# Patient Record
Sex: MALE | Race: BLACK OR AFRICAN AMERICAN | URBAN - METROPOLITAN AREA
[De-identification: names, ages, dates, MRNs, and addresses within clinical notes are randomized per-mention and may not be internally consistent; named-entity substitution may affect disease eponyms.]

---

## 2022-02-21 ENCOUNTER — WEB ENCOUNTER (OUTPATIENT)
Dept: URBAN - METROPOLITAN AREA CLINIC 98 | Facility: CLINIC | Age: 57
End: 2022-02-21

## 2022-02-21 ENCOUNTER — LAB OUTSIDE AN ENCOUNTER (OUTPATIENT)
Dept: URBAN - METROPOLITAN AREA CLINIC 98 | Facility: CLINIC | Age: 57
End: 2022-02-21

## 2022-02-21 ENCOUNTER — OFFICE VISIT (OUTPATIENT)
Dept: URBAN - METROPOLITAN AREA CLINIC 98 | Facility: CLINIC | Age: 57
End: 2022-02-21
Payer: COMMERCIAL

## 2022-02-21 VITALS
TEMPERATURE: 96.8 F | WEIGHT: 185.8 LBS | HEART RATE: 86 BPM | SYSTOLIC BLOOD PRESSURE: 111 MMHG | HEIGHT: 72 IN | DIASTOLIC BLOOD PRESSURE: 74 MMHG | BODY MASS INDEX: 25.17 KG/M2

## 2022-02-21 DIAGNOSIS — Z12.11 COLON CANCER SCREENING: ICD-10-CM

## 2022-02-21 DIAGNOSIS — K21.9 GERD: ICD-10-CM

## 2022-02-21 DIAGNOSIS — R10.13 DYSPEPSIA: ICD-10-CM

## 2022-02-21 PROCEDURE — 3017F COLORECTAL CA SCREEN DOC REV: CPT | Performed by: INTERNAL MEDICINE

## 2022-02-21 PROCEDURE — G8482 FLU IMMUNIZE ORDER/ADMIN: HCPCS | Performed by: INTERNAL MEDICINE

## 2022-02-21 PROCEDURE — G8420 CALC BMI NORM PARAMETERS: HCPCS | Performed by: INTERNAL MEDICINE

## 2022-02-21 PROCEDURE — G9903 PT SCRN TBCO ID AS NON USER: HCPCS | Performed by: INTERNAL MEDICINE

## 2022-02-21 PROCEDURE — G8427 DOCREV CUR MEDS BY ELIG CLIN: HCPCS | Performed by: INTERNAL MEDICINE

## 2022-02-21 PROCEDURE — 99204 OFFICE O/P NEW MOD 45 MIN: CPT | Performed by: INTERNAL MEDICINE

## 2022-02-21 RX ORDER — OMEPRAZOLE 40 MG/1
1 CAPSULE 30 MINUTES BEFORE MORNING MEAL CAPSULE, DELAYED RELEASE ORAL ONCE A DAY
Qty: 90 | Refills: 3 | OUTPATIENT
Start: 2022-02-21

## 2022-02-21 NOTE — HPI-TODAY'S VISIT:
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  He saw general surgery without concerns Symptoms worse with inciting foods Taking pepcid OTC, which does help.  Taking PRN No weight loss or dysphagia

## 2022-03-04 ENCOUNTER — LAB OUTSIDE AN ENCOUNTER (OUTPATIENT)
Dept: URBAN - METROPOLITAN AREA CLINIC 98 | Facility: CLINIC | Age: 57
End: 2022-03-04

## 2022-03-04 ENCOUNTER — TELEPHONE ENCOUNTER (OUTPATIENT)
Dept: URBAN - METROPOLITAN AREA CLINIC 40 | Facility: CLINIC | Age: 57
End: 2022-03-04

## 2022-03-08 LAB
H PYLORI BREATH TEST: POSITIVE
H. PYLORI BREATH COLLECTION: (no result)

## 2022-03-10 ENCOUNTER — LAB OUTSIDE AN ENCOUNTER (OUTPATIENT)
Dept: URBAN - METROPOLITAN AREA CLINIC 98 | Facility: CLINIC | Age: 57
End: 2022-03-10

## 2022-03-10 ENCOUNTER — TELEPHONE ENCOUNTER (OUTPATIENT)
Dept: URBAN - METROPOLITAN AREA CLINIC 98 | Facility: CLINIC | Age: 57
End: 2022-03-10

## 2022-03-10 RX ORDER — AMOXICILLIN 500 MG/1
2 CAPSULES TABLET, FILM COATED ORAL TWICE A DAY
Qty: 56 CAPSULE | Refills: 0 | OUTPATIENT

## 2022-03-10 RX ORDER — OMEPRAZOLE 20 MG/1
1 CAPSULE 30 MINUTES BEFORE MORNING MEAL CAPSULE, DELAYED RELEASE ORAL
Qty: 28 | Refills: 0 | OUTPATIENT

## 2022-03-10 RX ORDER — CLARITHROMYCIN 500 MG/1
1 TABLET TABLET, FILM COATED ORAL
Qty: 28 TABLET | Refills: 0 | OUTPATIENT

## 2022-05-09 ENCOUNTER — LAB OUTSIDE AN ENCOUNTER (OUTPATIENT)
Dept: URBAN - METROPOLITAN AREA CLINIC 98 | Facility: CLINIC | Age: 57
End: 2022-05-09

## 2022-05-09 ENCOUNTER — WEB ENCOUNTER (OUTPATIENT)
Dept: URBAN - METROPOLITAN AREA CLINIC 98 | Facility: CLINIC | Age: 57
End: 2022-05-09

## 2022-05-09 ENCOUNTER — OFFICE VISIT (OUTPATIENT)
Dept: URBAN - METROPOLITAN AREA CLINIC 98 | Facility: CLINIC | Age: 57
End: 2022-05-09
Payer: COMMERCIAL

## 2022-05-09 VITALS
DIASTOLIC BLOOD PRESSURE: 80 MMHG | BODY MASS INDEX: 25.38 KG/M2 | WEIGHT: 187.4 LBS | HEART RATE: 79 BPM | SYSTOLIC BLOOD PRESSURE: 135 MMHG | HEIGHT: 72 IN | TEMPERATURE: 97 F

## 2022-05-09 DIAGNOSIS — Z12.11 COLON CANCER SCREENING: ICD-10-CM

## 2022-05-09 DIAGNOSIS — K21.9 GERD: ICD-10-CM

## 2022-05-09 DIAGNOSIS — A04.8 HELICOBACTER PYLORI (H. PYLORI): ICD-10-CM

## 2022-05-09 DIAGNOSIS — R10.13 DYSPEPSIA: ICD-10-CM

## 2022-05-09 PROCEDURE — G8427 DOCREV CUR MEDS BY ELIG CLIN: HCPCS | Performed by: INTERNAL MEDICINE

## 2022-05-09 PROCEDURE — 3017F COLORECTAL CA SCREEN DOC REV: CPT | Performed by: INTERNAL MEDICINE

## 2022-05-09 PROCEDURE — G8482 FLU IMMUNIZE ORDER/ADMIN: HCPCS | Performed by: INTERNAL MEDICINE

## 2022-05-09 PROCEDURE — 99214 OFFICE O/P EST MOD 30 MIN: CPT | Performed by: INTERNAL MEDICINE

## 2022-05-09 PROCEDURE — G9903 PT SCRN TBCO ID AS NON USER: HCPCS | Performed by: INTERNAL MEDICINE

## 2022-05-09 PROCEDURE — G8420 CALC BMI NORM PARAMETERS: HCPCS | Performed by: INTERNAL MEDICINE

## 2022-05-09 RX ORDER — AMOXICILLIN 500 MG/1
2 CAPSULES TABLET, FILM COATED ORAL TWICE A DAY
Qty: 56 CAPSULE | Refills: 0 | Status: ACTIVE | COMMUNITY

## 2022-05-09 RX ORDER — OMEPRAZOLE 20 MG/1
1 CAPSULE 30 MINUTES BEFORE MORNING MEAL CAPSULE, DELAYED RELEASE ORAL
Qty: 28 | Refills: 0 | Status: ACTIVE | COMMUNITY

## 2022-05-09 RX ORDER — CLARITHROMYCIN 500 MG/1
1 TABLET TABLET, FILM COATED ORAL
Qty: 28 TABLET | Refills: 0 | Status: ACTIVE | COMMUNITY

## 2022-05-09 RX ORDER — OMEPRAZOLE 40 MG/1
1 CAPSULE 30 MINUTES BEFORE MORNING MEAL CAPSULE, DELAYED RELEASE ORAL ONCE A DAY
Qty: 90 | Refills: 3 | Status: ACTIVE | COMMUNITY
Start: 2022-02-21

## 2022-05-09 NOTE — HPI-TODAY'S VISIT:
f/u visit After last visit we go HP breath test that was positive.   Treated with triple therapy that her completed around 3/24/22 He has continued on Omeprazole from time to time Abd pain seems to be improved, but not gone.  Stills has some discomfort Needs breath test to confirm eradication    . 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  He saw general surgery without concerns Symptoms worse with inciting foods Taking pepcid OTC, which does help.  Taking PRN No weight loss or dysphagia

## 2022-06-29 PROBLEM — 162031009: Status: ACTIVE | Noted: 2022-02-21

## 2022-07-20 ENCOUNTER — TELEPHONE ENCOUNTER (OUTPATIENT)
Dept: URBAN - METROPOLITAN AREA CLINIC 98 | Facility: CLINIC | Age: 57
End: 2022-07-20

## 2022-07-21 ENCOUNTER — OFFICE VISIT (OUTPATIENT)
Dept: URBAN - METROPOLITAN AREA SURGERY CENTER 18 | Facility: SURGERY CENTER | Age: 57
End: 2022-07-21

## 2022-09-19 ENCOUNTER — TELEPHONE ENCOUNTER (OUTPATIENT)
Dept: URBAN - METROPOLITAN AREA CLINIC 98 | Facility: CLINIC | Age: 57
End: 2022-09-19

## 2022-09-20 ENCOUNTER — TELEPHONE ENCOUNTER (OUTPATIENT)
Dept: URBAN - METROPOLITAN AREA CLINIC 98 | Facility: CLINIC | Age: 57
End: 2022-09-20

## 2022-09-22 ENCOUNTER — OFFICE VISIT (OUTPATIENT)
Dept: URBAN - METROPOLITAN AREA SURGERY CENTER 18 | Facility: SURGERY CENTER | Age: 57
End: 2022-09-22

## 2022-10-13 ENCOUNTER — CLAIMS CREATED FROM THE CLAIM WINDOW (OUTPATIENT)
Dept: URBAN - METROPOLITAN AREA SURGERY CENTER 18 | Facility: SURGERY CENTER | Age: 57
End: 2022-10-13

## 2022-10-13 ENCOUNTER — CLAIMS CREATED FROM THE CLAIM WINDOW (OUTPATIENT)
Dept: URBAN - METROPOLITAN AREA CLINIC 4 | Facility: CLINIC | Age: 57
End: 2022-10-13
Payer: COMMERCIAL

## 2022-10-13 ENCOUNTER — CLAIMS CREATED FROM THE CLAIM WINDOW (OUTPATIENT)
Dept: URBAN - METROPOLITAN AREA SURGERY CENTER 18 | Facility: SURGERY CENTER | Age: 57
End: 2022-10-13
Payer: COMMERCIAL

## 2022-10-13 DIAGNOSIS — K21.9 GERD: ICD-10-CM

## 2022-10-13 DIAGNOSIS — A04.8 HELICOBACTER PYLORI (H. PYLORI): ICD-10-CM

## 2022-10-13 DIAGNOSIS — K31.A0 GASTRIC INTESTINAL METAPLASIA, UNSPECIFIED: ICD-10-CM

## 2022-10-13 DIAGNOSIS — Z12.11 COLON CANCER SCREENING: ICD-10-CM

## 2022-10-13 DIAGNOSIS — R10.13 DYSPEPSIA: ICD-10-CM

## 2022-10-13 DIAGNOSIS — K29.60 ADENOPAPILLOMATOSIS GASTRICA: ICD-10-CM

## 2022-10-13 PROBLEM — 235595009 GASTROESOPHAGEAL REFLUX DISEASE: Status: ACTIVE | Noted: 2022-02-21

## 2022-10-13 PROCEDURE — 88305 TISSUE EXAM BY PATHOLOGIST: CPT | Performed by: PATHOLOGY

## 2022-10-13 PROCEDURE — G8907 PT DOC NO EVENTS ON DISCHARG: HCPCS | Performed by: INTERNAL MEDICINE

## 2022-10-13 PROCEDURE — 88342 IMHCHEM/IMCYTCHM 1ST ANTB: CPT | Performed by: PATHOLOGY

## 2022-10-13 PROCEDURE — 43239 EGD BIOPSY SINGLE/MULTIPLE: CPT | Performed by: INTERNAL MEDICINE

## 2022-10-13 RX ORDER — OMEPRAZOLE 40 MG/1
1 CAPSULE 30 MINUTES BEFORE MORNING MEAL CAPSULE, DELAYED RELEASE ORAL ONCE A DAY
Qty: 90 | Refills: 3 | Status: ACTIVE | COMMUNITY
Start: 2022-02-21

## 2022-10-13 RX ORDER — AMOXICILLIN 500 MG/1
2 CAPSULES TABLET, FILM COATED ORAL TWICE A DAY
Qty: 56 CAPSULE | Refills: 0 | Status: ACTIVE | COMMUNITY

## 2022-10-13 RX ORDER — CLARITHROMYCIN 500 MG/1
1 TABLET TABLET, FILM COATED ORAL
Qty: 28 TABLET | Refills: 0 | Status: ACTIVE | COMMUNITY

## 2022-10-13 RX ORDER — HYOSCYAMINE SULFATE 0.12 MG/1
1 TABLET UNDER THE TONGUE AND ALLOW TO DISSOLVE  AS NEEDED TABLET, ORALLY DISINTEGRATING ORAL
Qty: 45 | Refills: 6 | OUTPATIENT
Start: 2022-10-13 | End: 2023-05-11

## 2022-10-13 RX ORDER — OMEPRAZOLE 20 MG/1
1 CAPSULE 30 MINUTES BEFORE MORNING MEAL CAPSULE, DELAYED RELEASE ORAL
Qty: 28 | Refills: 0 | Status: ACTIVE | COMMUNITY

## 2022-10-14 ENCOUNTER — TELEPHONE ENCOUNTER (OUTPATIENT)
Dept: URBAN - METROPOLITAN AREA CLINIC 98 | Facility: CLINIC | Age: 57
End: 2022-10-14

## 2022-10-17 ENCOUNTER — TELEPHONE ENCOUNTER (OUTPATIENT)
Dept: URBAN - METROPOLITAN AREA CLINIC 6 | Facility: CLINIC | Age: 57
End: 2022-10-17

## 2022-10-17 ENCOUNTER — TELEPHONE ENCOUNTER (OUTPATIENT)
Dept: URBAN - METROPOLITAN AREA CLINIC 98 | Facility: CLINIC | Age: 57
End: 2022-10-17

## 2022-10-27 ENCOUNTER — TELEPHONE ENCOUNTER (OUTPATIENT)
Dept: URBAN - METROPOLITAN AREA CLINIC 98 | Facility: CLINIC | Age: 57
End: 2022-10-27

## 2022-11-10 ENCOUNTER — OFFICE VISIT (OUTPATIENT)
Dept: URBAN - METROPOLITAN AREA SURGERY CENTER 18 | Facility: SURGERY CENTER | Age: 57
End: 2022-11-10

## 2023-05-03 ENCOUNTER — TELEPHONE ENCOUNTER (OUTPATIENT)
Dept: URBAN - METROPOLITAN AREA CLINIC 98 | Facility: CLINIC | Age: 58
End: 2023-05-03

## 2023-05-03 ENCOUNTER — OFFICE VISIT (OUTPATIENT)
Dept: URBAN - METROPOLITAN AREA CLINIC 98 | Facility: CLINIC | Age: 58
End: 2023-05-03
Payer: COMMERCIAL

## 2023-05-03 ENCOUNTER — LAB OUTSIDE AN ENCOUNTER (OUTPATIENT)
Dept: URBAN - METROPOLITAN AREA CLINIC 98 | Facility: CLINIC | Age: 58
End: 2023-05-03

## 2023-05-03 VITALS
SYSTOLIC BLOOD PRESSURE: 128 MMHG | DIASTOLIC BLOOD PRESSURE: 77 MMHG | HEART RATE: 86 BPM | WEIGHT: 184.4 LBS | BODY MASS INDEX: 24.98 KG/M2 | TEMPERATURE: 97.5 F | HEIGHT: 72 IN

## 2023-05-03 DIAGNOSIS — Z12.11 COLON CANCER SCREENING: ICD-10-CM

## 2023-05-03 DIAGNOSIS — R10.31 RLQ ABDOMINAL PAIN: ICD-10-CM

## 2023-05-03 PROCEDURE — 99213 OFFICE O/P EST LOW 20 MIN: CPT

## 2023-05-03 RX ORDER — SODIUM, POTASSIUM,MAG SULFATES 17.5-3.13G
177 ML SOLUTION, RECONSTITUTED, ORAL ORAL AS DIRECTED
Qty: 1 KIT | Refills: 0 | OUTPATIENT
Start: 2023-05-03 | End: 2023-05-04

## 2023-05-03 RX ORDER — HYOSCYAMINE SULFATE 0.12 MG/1
1 TABLET UNDER THE TONGUE AND ALLOW TO DISSOLVE  AS NEEDED TABLET, ORALLY DISINTEGRATING ORAL
Qty: 45 | Refills: 6 | Status: ON HOLD | COMMUNITY
Start: 2022-10-13 | End: 2023-05-11

## 2023-05-03 RX ORDER — OMEPRAZOLE 40 MG/1
1 CAPSULE 30 MINUTES BEFORE MORNING MEAL CAPSULE, DELAYED RELEASE ORAL ONCE A DAY
Qty: 90 | Refills: 3 | Status: ON HOLD | COMMUNITY
Start: 2022-02-21

## 2023-05-03 RX ORDER — OMEPRAZOLE 20 MG/1
1 CAPSULE 30 MINUTES BEFORE MORNING MEAL CAPSULE, DELAYED RELEASE ORAL
Qty: 28 | Refills: 0 | Status: ON HOLD | COMMUNITY

## 2023-05-03 RX ORDER — AMOXICILLIN 500 MG/1
2 CAPSULES TABLET, FILM COATED ORAL TWICE A DAY
Qty: 56 CAPSULE | Refills: 0 | Status: ON HOLD | COMMUNITY

## 2023-05-03 RX ORDER — CLARITHROMYCIN 500 MG/1
1 TABLET TABLET, FILM COATED ORAL
Qty: 28 TABLET | Refills: 0 | Status: ON HOLD | COMMUNITY

## 2023-05-03 NOTE — HPI-TODAY'S VISIT:
Today on 5/3/2023, The EGD completed 10/13/2022.  Findings included normal-appearing esophagus. erythematous mucosa in the sotmach and normal appearing duodenum. Bx show inactive chronic gastritis consistent with treat h pylori. No h pylori identified.  Patient endorses he continued to  have "dull pain" in abdomen  Located in the right lower quadrant/flank Denies epigastric abdominal pain, heartburn or indigestion  Feels pain daily- unclear of eating exacerbates it  Ranking for 5-6/10  Denies changes in bowel movements BMs daily. Denies BRBPR or melena  He is "overdue" for repeat colonsocopy per patient as it has been a while. He denies history of colon polyps  Denies unintentional weight loss Had a CT scan ordered of the chest which was normal with PCP- was supposed to have CT abdomen as ordered by Dr. Mendel day of having chest CT but he reports he did not  Was given levsin as needed for PCP and this helps but he does not want to be on forever  . f/u visit After last visit we go HP breath test that was positive.   Treated with triple therapy that her completed around 3/24/22 He has continued on Omeprazole from time to time Abd pain seems to be improved, but not gone.  Stills has some discomfort Needs breath test to confirm eradication    . 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  He saw general surgery without concerns Symptoms worse with inciting foods Taking pepcid OTC, which does help.  Taking PRN No weight loss or dysphagia

## 2023-05-04 ENCOUNTER — TELEPHONE ENCOUNTER (OUTPATIENT)
Dept: URBAN - METROPOLITAN AREA CLINIC 98 | Facility: CLINIC | Age: 58
End: 2023-05-04

## 2023-05-11 ENCOUNTER — CLAIMS CREATED FROM THE CLAIM WINDOW (OUTPATIENT)
Dept: URBAN - METROPOLITAN AREA CLINIC 4 | Facility: CLINIC | Age: 58
End: 2023-05-11
Payer: COMMERCIAL

## 2023-05-11 ENCOUNTER — OFFICE VISIT (OUTPATIENT)
Dept: URBAN - METROPOLITAN AREA SURGERY CENTER 18 | Facility: SURGERY CENTER | Age: 58
End: 2023-05-11
Payer: COMMERCIAL

## 2023-05-11 DIAGNOSIS — Z12.11 COLON CANCER SCREENING: ICD-10-CM

## 2023-05-11 DIAGNOSIS — D12.5 ADENOMA OF SIGMOID COLON: ICD-10-CM

## 2023-05-11 DIAGNOSIS — D12.5 BENIGN NEOPLASM OF SIGMOID COLON: ICD-10-CM

## 2023-05-11 PROCEDURE — G8907 PT DOC NO EVENTS ON DISCHARG: HCPCS | Performed by: INTERNAL MEDICINE

## 2023-05-11 PROCEDURE — 45380 COLONOSCOPY AND BIOPSY: CPT | Performed by: INTERNAL MEDICINE

## 2023-05-11 PROCEDURE — 88305 TISSUE EXAM BY PATHOLOGIST: CPT | Performed by: PATHOLOGY

## 2023-05-11 RX ORDER — OMEPRAZOLE 40 MG/1
1 CAPSULE 30 MINUTES BEFORE MORNING MEAL CAPSULE, DELAYED RELEASE ORAL ONCE A DAY
Qty: 90 | Refills: 3 | Status: ON HOLD | COMMUNITY
Start: 2022-02-21

## 2023-05-11 RX ORDER — HYOSCYAMINE SULFATE 0.12 MG/1
1 TABLET UNDER THE TONGUE AND ALLOW TO DISSOLVE  AS NEEDED TABLET, ORALLY DISINTEGRATING ORAL
Qty: 45 | Refills: 6 | Status: ON HOLD | COMMUNITY
Start: 2022-10-13 | End: 2023-05-11

## 2023-05-11 RX ORDER — CLARITHROMYCIN 500 MG/1
1 TABLET TABLET, FILM COATED ORAL
Qty: 28 TABLET | Refills: 0 | Status: ON HOLD | COMMUNITY

## 2023-05-11 RX ORDER — AMOXICILLIN 500 MG/1
2 CAPSULES TABLET, FILM COATED ORAL TWICE A DAY
Qty: 56 CAPSULE | Refills: 0 | Status: ON HOLD | COMMUNITY

## 2023-05-11 RX ORDER — OMEPRAZOLE 20 MG/1
1 CAPSULE 30 MINUTES BEFORE MORNING MEAL CAPSULE, DELAYED RELEASE ORAL
Qty: 28 | Refills: 0 | Status: ON HOLD | COMMUNITY

## 2023-05-22 ENCOUNTER — OFFICE VISIT (OUTPATIENT)
Dept: URBAN - METROPOLITAN AREA CLINIC 98 | Facility: CLINIC | Age: 58
End: 2023-05-22

## 2023-05-25 ENCOUNTER — LAB OUTSIDE AN ENCOUNTER (OUTPATIENT)
Dept: URBAN - METROPOLITAN AREA CLINIC 98 | Facility: CLINIC | Age: 58
End: 2023-05-25

## 2023-05-25 ENCOUNTER — OFFICE VISIT (OUTPATIENT)
Dept: URBAN - METROPOLITAN AREA SURGERY CENTER 18 | Facility: SURGERY CENTER | Age: 58
End: 2023-05-25

## 2023-07-05 ENCOUNTER — TELEPHONE ENCOUNTER (OUTPATIENT)
Dept: URBAN - METROPOLITAN AREA CLINIC 98 | Facility: CLINIC | Age: 58
End: 2023-07-05

## 2023-08-04 ENCOUNTER — DASHBOARD ENCOUNTERS (OUTPATIENT)
Age: 58
End: 2023-08-04

## 2023-08-07 ENCOUNTER — OFFICE VISIT (OUTPATIENT)
Dept: URBAN - METROPOLITAN AREA TELEHEALTH 2 | Facility: TELEHEALTH | Age: 58
End: 2023-08-07
Payer: COMMERCIAL

## 2023-08-07 VITALS — TEMPERATURE: 98.1 F | BODY MASS INDEX: 24.98 KG/M2 | HEIGHT: 72 IN | WEIGHT: 184.4 LBS

## 2023-08-07 DIAGNOSIS — A04.8 HELICOBACTER PYLORI (H. PYLORI): ICD-10-CM

## 2023-08-07 DIAGNOSIS — K52.9 ENTERITIS: ICD-10-CM

## 2023-08-07 DIAGNOSIS — R10.84 GENERALIZED ABDOMINAL PAIN: ICD-10-CM

## 2023-08-07 DIAGNOSIS — K21.9 GERD: ICD-10-CM

## 2023-08-07 PROBLEM — 102614006: Status: ACTIVE | Noted: 2023-08-07

## 2023-08-07 PROBLEM — 64613007: Status: ACTIVE | Noted: 2023-08-07

## 2023-08-07 PROBLEM — 428283002: Status: ACTIVE | Noted: 2023-08-07

## 2023-08-07 PROCEDURE — 99442 PHONE E/M BY PHYS 11-20 MIN: CPT | Performed by: INTERNAL MEDICINE

## 2023-08-07 RX ORDER — AMOXICILLIN 500 MG/1
2 CAPSULES TABLET, FILM COATED ORAL TWICE A DAY
Qty: 56 CAPSULE | Refills: 0 | COMMUNITY

## 2023-08-07 RX ORDER — OMEPRAZOLE 20 MG/1
1 CAPSULE 30 MINUTES BEFORE MORNING MEAL CAPSULE, DELAYED RELEASE ORAL
Qty: 28 | Refills: 0 | COMMUNITY

## 2023-08-07 RX ORDER — CLARITHROMYCIN 500 MG/1
1 TABLET TABLET, FILM COATED ORAL
Qty: 28 TABLET | Refills: 0 | COMMUNITY

## 2023-08-07 RX ORDER — OMEPRAZOLE 40 MG/1
1 CAPSULE 30 MINUTES BEFORE MORNING MEAL CAPSULE, DELAYED RELEASE ORAL ONCE A DAY
Qty: 90 | Refills: 3 | COMMUNITY
Start: 2022-02-21

## 2023-08-07 NOTE — HPI-TODAY'S VISIT:
CT scan done May 2023 Non-specific findings in TI.  Colonoscopy 5/2023 had no abnromal findings on views of TI Continued dull pain in abdomen Typically occurs in the morning and improves as day goes on Originally on right side and flank, but now more diffuse May be a little better with bowel movement in the morning . PRIOR VISIT: Today on 5/3/2023, The EGD completed 10/13/2022.  Findings included normal-appearing esophagus. erythematous mucosa in the sotmach and normal appearing duodenum. Bx show inactive chronic gastritis consistent with treat h pylori. No h pylori identified.  Patient endorses he continued to  have "dull pain" in abdomen  Located in the right lower quadrant/flank Denies epigastric abdominal pain, heartburn or indigestion  Feels pain daily- unclear of eating exacerbates it  Ranking for 5-6/10  Denies changes in bowel movements BMs daily. Denies BRBPR or melena  He is "overdue" for repeat colonsocopy per patient as it has been a while. He denies history of colon polyps  Denies unintentional weight loss Had a CT scan ordered of the chest which was normal with PCP- was supposed to have CT abdomen as ordered by Dr. Mendel day of having chest CT but he reports he did not  Was given levsin as needed for PCP and this helps but he does not want to be on forever  . f/u visit After last visit we go HP breath test that was positive.   Treated with triple therapy that her completed around 3/24/22 He has continued on Omeprazole from time to time Abd pain seems to be improved, but not gone.  Stills has some discomfort Needs breath test to confirm eradication    . 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  He saw general surgery without concerns Symptoms worse with inciting foods Taking pepcid OTC, which does help.  Taking PRN No weight loss or dysphagia

## 2023-09-13 ENCOUNTER — OFFICE VISIT (OUTPATIENT)
Dept: URBAN - METROPOLITAN AREA TELEHEALTH 2 | Facility: TELEHEALTH | Age: 58
End: 2023-09-13
Payer: COMMERCIAL

## 2023-09-13 ENCOUNTER — LAB OUTSIDE AN ENCOUNTER (OUTPATIENT)
Dept: URBAN - METROPOLITAN AREA TELEHEALTH 2 | Facility: TELEHEALTH | Age: 58
End: 2023-09-13

## 2023-09-13 DIAGNOSIS — R10.31 RLQ ABDOMINAL PAIN: ICD-10-CM

## 2023-09-13 DIAGNOSIS — R10.84 GENERALIZED ABDOMINAL PAIN: ICD-10-CM

## 2023-09-13 DIAGNOSIS — A04.8 HELICOBACTER PYLORI (H. PYLORI): ICD-10-CM

## 2023-09-13 DIAGNOSIS — K21.9 GERD: ICD-10-CM

## 2023-09-13 PROCEDURE — 99214 OFFICE O/P EST MOD 30 MIN: CPT | Performed by: INTERNAL MEDICINE

## 2023-09-13 RX ORDER — OMEPRAZOLE 40 MG/1
1 CAPSULE 30 MINUTES BEFORE MORNING MEAL CAPSULE, DELAYED RELEASE ORAL ONCE A DAY
Qty: 90 | Refills: 3 | COMMUNITY
Start: 2022-02-21

## 2023-09-13 RX ORDER — CLARITHROMYCIN 500 MG/1
1 TABLET TABLET, FILM COATED ORAL
Qty: 28 TABLET | Refills: 0 | COMMUNITY

## 2023-09-13 RX ORDER — OMEPRAZOLE 20 MG/1
1 CAPSULE 30 MINUTES BEFORE MORNING MEAL CAPSULE, DELAYED RELEASE ORAL
Qty: 28 | Refills: 0 | COMMUNITY

## 2023-09-13 RX ORDER — AMOXICILLIN 500 MG/1
2 CAPSULES TABLET, FILM COATED ORAL TWICE A DAY
Qty: 56 CAPSULE | Refills: 0 | COMMUNITY

## 2023-09-13 NOTE — HPI-TODAY'S VISIT:
f/u visit Continued abd pain as before, but better the past few days Taking benefiber Would like to proceed with pillcam  . PRIOR VISIT: CT scan done May 2023 Non-specific findings in TI.  Colonoscopy 5/2023 had no abnromal findings on views of TI Continued dull pain in abdomen Typically occurs in the morning and improves as day goes on Originally on right side and flank, but now more diffuse May be a little better with bowel movement in the morning . PRIOR VISIT: Today on 5/3/2023, The EGD completed 10/13/2022.  Findings included normal-appearing esophagus. erythematous mucosa in the sotmach and normal appearing duodenum. Bx show inactive chronic gastritis consistent with treat h pylori. No h pylori identified.  Patient endorses he continued to  have "dull pain" in abdomen  Located in the right lower quadrant/flank Denies epigastric abdominal pain, heartburn or indigestion  Feels pain daily- unclear of eating exacerbates it  Ranking for 5-6/10  Denies changes in bowel movements BMs daily. Denies BRBPR or melena  He is "overdue" for repeat colonsocopy per patient as it has been a while. He denies history of colon polyps  Denies unintentional weight loss Had a CT scan ordered of the chest which was normal with PCP- was supposed to have CT abdomen as ordered by Dr. Mendel day of having chest CT but he reports he did not  Was given levsin as needed for PCP and this helps but he does not want to be on forever  . f/u visit After last visit we go HP breath test that was positive.   Treated with triple therapy that her completed around 3/24/22 He has continued on Omeprazole from time to time Abd pain seems to be improved, but not gone.  Stills has some discomfort Needs breath test to confirm eradication    . 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  He saw general surgery without concerns Symptoms worse with inciting foods Taking pepcid OTC, which does help.  Taking PRN No weight loss or dysphagia

## 2023-09-15 ENCOUNTER — OFFICE VISIT (OUTPATIENT)
Dept: URBAN - METROPOLITAN AREA CLINIC 97 | Facility: CLINIC | Age: 58
End: 2023-09-15

## 2024-01-26 ENCOUNTER — LAB OUTSIDE AN ENCOUNTER (OUTPATIENT)
Dept: URBAN - METROPOLITAN AREA CLINIC 98 | Facility: CLINIC | Age: 59
End: 2024-01-26

## 2024-02-07 ENCOUNTER — TELEP (OUTPATIENT)
Dept: URBAN - METROPOLITAN AREA TELEHEALTH 2 | Facility: TELEHEALTH | Age: 59
End: 2024-02-07
Payer: COMMERCIAL

## 2024-02-07 DIAGNOSIS — K52.89 (LYMPHOCYTIC) MICROSCOPIC COLITIS: ICD-10-CM

## 2024-02-07 DIAGNOSIS — A04.8 HELICOBACTER PYLORI (H. PYLORI): ICD-10-CM

## 2024-02-07 DIAGNOSIS — K21.9 GERD: ICD-10-CM

## 2024-02-07 DIAGNOSIS — R10.84 GENERALIZED ABDOMINAL PAIN: ICD-10-CM

## 2024-02-07 PROBLEM — 82934008: Status: ACTIVE | Noted: 2024-02-07

## 2024-02-07 PROCEDURE — 99214 OFFICE O/P EST MOD 30 MIN: CPT | Performed by: INTERNAL MEDICINE

## 2024-02-07 RX ORDER — AMOXICILLIN 500 MG/1
2 CAPSULES TABLET, FILM COATED ORAL TWICE A DAY
Qty: 56 CAPSULE | Refills: 0 | COMMUNITY

## 2024-02-07 RX ORDER — OMEPRAZOLE 20 MG/1
1 CAPSULE 30 MINUTES BEFORE MORNING MEAL CAPSULE, DELAYED RELEASE ORAL
Qty: 28 | Refills: 0 | COMMUNITY

## 2024-02-07 RX ORDER — OMEPRAZOLE 40 MG/1
1 CAPSULE 30 MINUTES BEFORE MORNING MEAL CAPSULE, DELAYED RELEASE ORAL ONCE A DAY
Qty: 90 | Refills: 3 | COMMUNITY
Start: 2022-02-21

## 2024-02-07 RX ORDER — CLARITHROMYCIN 500 MG/1
1 TABLET TABLET, FILM COATED ORAL
Qty: 28 TABLET | Refills: 0 | COMMUNITY

## 2024-02-07 NOTE — HPI-TODAY'S VISIT:
f/u visit MRE with significant stool burden He is taking benefiber Saw cardiology without concern  . PRIOR VISIT: Continued abd pain as before, but better the past few days Taking benefiber Would like to proceed with pillcam  . PRIOR VISIT: CT scan done May 2023 Non-specific findings in TI.  Colonoscopy 5/2023 had no abnromal findings on views of TI Continued dull pain in abdomen Typically occurs in the morning and improves as day goes on Originally on right side and flank, but now more diffuse May be a little better with bowel movement in the morning . PRIOR VISIT: Today on 5/3/2023, The EGD completed 10/13/2022.  Findings included normal-appearing esophagus. erythematous mucosa in the sotmach and normal appearing duodenum. Bx show inactive chronic gastritis consistent with treat h pylori. No h pylori identified.  Patient endorses he continued to  have "dull pain" in abdomen  Located in the right lower quadrant/flank Denies epigastric abdominal pain, heartburn or indigestion  Feels pain daily- unclear of eating exacerbates it  Ranking for 5-6/10  Denies changes in bowel movements BMs daily. Denies BRBPR or melena  He is "overdue" for repeat colonsocopy per patient as it has been a while. He denies history of colon polyps  Denies unintentional weight loss Had a CT scan ordered of the chest which was normal with PCP- was supposed to have CT abdomen as ordered by Dr. Mendel day of having chest CT but he reports he did not  Was given levsin as needed for PCP and this helps but he does not want to be on forever  . f/u visit After last visit we go HP breath test that was positive.   Treated with triple therapy that her completed around 3/24/22 He has continued on Omeprazole from time to time Abd pain seems to be improved, but not gone.  Stills has some discomfort Needs breath test to confirm eradication    . 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  He saw general surgery without concerns Symptoms worse with inciting foods Taking pepcid OTC, which does help.  Taking PRN No weight loss or dysphagia

## 2024-03-20 ENCOUNTER — LAB (OUTPATIENT)
Dept: URBAN - METROPOLITAN AREA TELEHEALTH 2 | Facility: TELEHEALTH | Age: 59
End: 2024-03-20

## 2024-03-20 ENCOUNTER — TELEP (OUTPATIENT)
Dept: URBAN - METROPOLITAN AREA TELEHEALTH 2 | Facility: TELEHEALTH | Age: 59
End: 2024-03-20
Payer: COMMERCIAL

## 2024-03-20 ENCOUNTER — OV EP (OUTPATIENT)
Dept: URBAN - METROPOLITAN AREA TELEHEALTH 2 | Facility: TELEHEALTH | Age: 59
End: 2024-03-20

## 2024-03-20 DIAGNOSIS — R10.13 DYSPEPSIA: ICD-10-CM

## 2024-03-20 DIAGNOSIS — K52.9 ENTERITIS: ICD-10-CM

## 2024-03-20 DIAGNOSIS — K21.9 GERD: ICD-10-CM

## 2024-03-20 DIAGNOSIS — Z86.010 PERSONAL HISTORY OF COLONIC POLYPS: ICD-10-CM

## 2024-03-20 DIAGNOSIS — R10.84 GENERALIZED ABDOMINAL PAIN: ICD-10-CM

## 2024-03-20 DIAGNOSIS — Z87.19 HISTORY OF IBS: ICD-10-CM

## 2024-03-20 DIAGNOSIS — A04.8 HELICOBACTER PYLORI (H. PYLORI): ICD-10-CM

## 2024-03-20 DIAGNOSIS — R10.31 RLQ ABDOMINAL PAIN: ICD-10-CM

## 2024-03-20 DIAGNOSIS — Z12.11 COLON CANCER SCREENING: ICD-10-CM

## 2024-03-20 DIAGNOSIS — K59.04 CHRONIC IDIOPATHIC CONSTIPATION: ICD-10-CM

## 2024-03-20 PROBLEM — 70871000119100: Status: ACTIVE | Noted: 2024-03-20

## 2024-03-20 PROCEDURE — 99214 OFFICE O/P EST MOD 30 MIN: CPT | Performed by: INTERNAL MEDICINE

## 2024-03-20 RX ORDER — OMEPRAZOLE 40 MG/1
1 CAPSULE 30 MINUTES BEFORE MORNING MEAL CAPSULE, DELAYED RELEASE ORAL ONCE A DAY
Qty: 90 | Refills: 3 | COMMUNITY
Start: 2022-02-21

## 2024-03-20 RX ORDER — CLARITHROMYCIN 500 MG/1
1 TABLET TABLET, FILM COATED ORAL
Qty: 28 TABLET | Refills: 0 | COMMUNITY

## 2024-03-20 RX ORDER — OMEPRAZOLE 20 MG/1
1 CAPSULE 30 MINUTES BEFORE MORNING MEAL CAPSULE, DELAYED RELEASE ORAL
Qty: 28 | Refills: 0 | COMMUNITY

## 2024-03-20 RX ORDER — AMOXICILLIN 500 MG/1
2 CAPSULES TABLET, FILM COATED ORAL TWICE A DAY
Qty: 56 CAPSULE | Refills: 0 | COMMUNITY

## 2024-03-20 NOTE — HPI-TODAY'S VISIT:
f/u visit Continued abd pain We reviewed MRE, CT, EGD/Colonoscopy without etiology He did feel better on Linzess 72mcg samples, but did have some loose stools Curious about Gallbladder . PRIOR VISIT: MRE with significant stool burden He is taking benefiber Saw cardiology without concern  . PRIOR VISIT: Continued abd pain as before, but better the past few days Taking benefiber Would like to proceed with pillcam  . PRIOR VISIT: CT scan done May 2023 Non-specific findings in TI.  Colonoscopy 5/2023 had no abnromal findings on views of TI Continued dull pain in abdomen Typically occurs in the morning and improves as day goes on Originally on right side and flank, but now more diffuse May be a little better with bowel movement in the morning . PRIOR VISIT: Today on 5/3/2023, The EGD completed 10/13/2022.  Findings included normal-appearing esophagus. erythematous mucosa in the sotmach and normal appearing duodenum. Bx show inactive chronic gastritis consistent with treat h pylori. No h pylori identified.  Patient endorses he continued to  have "dull pain" in abdomen  Located in the right lower quadrant/flank Denies epigastric abdominal pain, heartburn or indigestion  Feels pain daily- unclear of eating exacerbates it  Ranking for 5-6/10  Denies changes in bowel movements BMs daily. Denies BRBPR or melena  He is "overdue" for repeat colonsocopy per patient as it has been a while. He denies history of colon polyps  Denies unintentional weight loss Had a CT scan ordered of the chest which was normal with PCP- was supposed to have CT abdomen as ordered by Dr. Mendel day of having chest CT but he reports he did not  Was given levsin as needed for PCP and this helps but he does not want to be on forever  . f/u visit After last visit we go HP breath test that was positive.   Treated with triple therapy that her completed around 3/24/22 He has continued on Omeprazole from time to time Abd pain seems to be improved, but not gone.  Stills has some discomfort Needs breath test to confirm eradication    . 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  He saw general surgery without concerns Symptoms worse with inciting foods Taking pepcid OTC, which does help.  Taking PRN No weight loss or dysphagia

## 2024-03-21 LAB
A/G RATIO: 1.6
ALBUMIN: 4.7
ALKALINE PHOSPHATASE: 70
ALT (SGPT): 28
AST (SGOT): 28
BASO (ABSOLUTE): 0.1
BASOS: 1
BILIRUBIN, TOTAL: 0.7
BUN/CREATININE RATIO: 12
BUN: 14
CALCIUM: 10.2
CARBON DIOXIDE, TOTAL: 28
CHLORIDE: 101
CREATININE: 1.18
EGFR: 72
EOS (ABSOLUTE): 0.2
EOS: 4
GLOBULIN, TOTAL: 2.9
GLUCOSE: 84
HEMATOCRIT: 44.7
HEMATOLOGY COMMENTS:: (no result)
HEMOGLOBIN: 14.8
IMMATURE CELLS: (no result)
IMMATURE GRANS (ABS): 0
IMMATURE GRANULOCYTES: 0
LYMPHS (ABSOLUTE): 2.4
LYMPHS: 39
MCH: 32
MCHC: 33.1
MCV: 97
MONOCYTES(ABSOLUTE): 0.5
MONOCYTES: 9
NEUTROPHILS (ABSOLUTE): 2.9
NEUTROPHILS: 47
NRBC: (no result)
PLATELETS: 202
POTASSIUM: 5
PROTEIN, TOTAL: 7.6
RBC: 4.62
RDW: 12
SODIUM: 141
T4,FREE(DIRECT): 1.22
TSH: 0.62
WBC: 6.1

## 2024-04-18 ENCOUNTER — LAB (OUTPATIENT)
Dept: URBAN - METROPOLITAN AREA CLINIC 98 | Facility: CLINIC | Age: 59
End: 2024-04-18

## 2024-06-25 ENCOUNTER — TELEPHONE ENCOUNTER (OUTPATIENT)
Dept: URBAN - METROPOLITAN AREA CLINIC 96 | Facility: CLINIC | Age: 59
End: 2024-06-25

## 2024-06-25 RX ORDER — PLECANATIDE 3 MG/1
1 TABLET TABLET ORAL ONCE A DAY
Qty: 30 | OUTPATIENT
Start: 2024-06-25 | End: 2024-07-25

## 2024-10-21 ENCOUNTER — TELEPHONE ENCOUNTER (OUTPATIENT)
Dept: URBAN - METROPOLITAN AREA CLINIC 96 | Facility: CLINIC | Age: 59
End: 2024-10-21

## 2024-10-28 ENCOUNTER — TELEPHONE ENCOUNTER (OUTPATIENT)
Dept: URBAN - METROPOLITAN AREA CLINIC 98 | Facility: CLINIC | Age: 59
End: 2024-10-28

## 2024-10-29 ENCOUNTER — TELEPHONE ENCOUNTER (OUTPATIENT)
Dept: URBAN - METROPOLITAN AREA CLINIC 98 | Facility: CLINIC | Age: 59
End: 2024-10-29

## 2024-12-16 ENCOUNTER — TELEPHONE ENCOUNTER (OUTPATIENT)
Dept: URBAN - METROPOLITAN AREA CLINIC 96 | Facility: CLINIC | Age: 59
End: 2024-12-16